# Patient Record
Sex: FEMALE | Race: BLACK OR AFRICAN AMERICAN | NOT HISPANIC OR LATINO
[De-identification: names, ages, dates, MRNs, and addresses within clinical notes are randomized per-mention and may not be internally consistent; named-entity substitution may affect disease eponyms.]

---

## 2020-09-27 ENCOUNTER — RECORDS - HEALTHEAST (OUTPATIENT)
Dept: ADMINISTRATIVE | Facility: OTHER | Age: 40
End: 2020-09-27

## 2021-06-05 VITALS — HEIGHT: 66 IN | BODY MASS INDEX: 30.99 KG/M2 | BODY MASS INDEX: 29.05 KG/M2 | WEIGHT: 180 LBS

## 2021-09-23 NOTE — TELEPHONE ENCOUNTER
RECORDS RECEIVED FROM:    DATE RECEIVED: 10.07.2021   NOTES STATUS DETAILS   OFFICE NOTE from referring provider N/A    OFFICE NOTE from other specialist  N/A    *Only VASCULITIS or LUPUS gather office notes for the following N/A    *PULMONARY   N/A    *ENT N/A    *DERMATOLOGY N/A    *RHEUMATOLOGY N/A    DISCHARGE SUMMARY from hospital Care Everywhere 09.14.2021 [facility]    DISCHARGE REPORT from the ER Care Everywhere 09.03.2021 [facility]   MEDICATION LIST Care Everywhere    IMAGING  (NEED IMAGES AND REPORTS)     KIDNEY CT SCAN N/A    KIDNEY ULTRASOUND N/A    MR ABDOMEN N/A    NUCLEAR MEDICINE RENAL N/A    LABS     CBC Care Everywhere 09.16.2021   CMP Care Everywhere 09.16.2021   BMP Care Everywhere 09.21.2021   UA Care Everywhere 09.12.2021   URINE PROTEIN Care Everywhere 09.12.2021   RENAL PANEL N/A    BIOPSY     KIDNEY BIOPSY  N/A

## 2021-10-04 DIAGNOSIS — E87.6 LOW SERUM POTASSIUM: ICD-10-CM

## 2021-10-04 DIAGNOSIS — R79.89 ELEVATED SERUM CREATININE: Primary | ICD-10-CM

## 2021-10-05 ENCOUNTER — TELEPHONE (OUTPATIENT)
Dept: NEPHROLOGY | Facility: CLINIC | Age: 41
End: 2021-10-05

## 2021-10-05 NOTE — TELEPHONE ENCOUNTER
Attempted to call the patient regarding appointment reminder and labs to be done before. Patient was sleeping and said she will call back.  Daksha Sanches LPN  Nephrology  [phone]

## 2021-10-07 ENCOUNTER — PRE VISIT (OUTPATIENT)
Dept: NEPHROLOGY | Facility: CLINIC | Age: 41
End: 2021-10-07

## 2021-10-07 ENCOUNTER — TELEPHONE (OUTPATIENT)
Dept: NEPHROLOGY | Facility: CLINIC | Age: 41
End: 2021-10-07

## 2021-10-07 NOTE — TELEPHONE ENCOUNTER
Call to patient.  Left voice message per Dr. Florian's request for a current medication record.  Updated Care everywhere, patient was seen yesterday at another outside Nephrology facility.  Message left for patient as this appointment today may not be needed any more. Will send to schedulers to call her and confirm  Daksha Sanches LPN  Nephrology  [phone]

## 2021-10-22 ENCOUNTER — TRANSCRIBE ORDERS (OUTPATIENT)
Dept: OTHER | Age: 41
End: 2021-10-22

## 2021-10-22 DIAGNOSIS — G37.9 DEMYELINATING DISEASE OF CENTRAL NERVOUS SYSTEM, UNSPECIFIED (H): Primary | ICD-10-CM

## 2021-11-17 DIAGNOSIS — R79.89 ELEVATED SERUM CREATININE: ICD-10-CM

## 2021-11-17 DIAGNOSIS — E87.1 HYPONATREMIA: Primary | ICD-10-CM

## 2021-11-17 NOTE — TELEPHONE ENCOUNTER
RECORDS RECEIVED FROM: Care Everywhere   DATE RECEIVED: 11.18.2021   NOTES STATUS DETAILS   OFFICE NOTE from referring provider N/A    OFFICE NOTE from other specialist  N/A    *Only VASCULITIS or LUPUS gather office notes for the following N/A    *PULMONARY   N/A    *ENT N/A    *DERMATOLOGY N/A    *RHEUMATOLOGY N/A    DISCHARGE SUMMARY from hospital Care Everywhee 09.14.2021 [facility]    DISCHARGE REPORT from the ER Care Everywhere 09.03.2021 [facility]     MEDICATION LIST Care Everywhere    IMAGING  (NEED IMAGES AND REPORTS)     KIDNEY CT SCAN Care Everywhere 09.03.2021 CT abdomen and pelvis      KIDNEY ULTRASOUND Care Everywhere - in process 10.28.2021 US Renal Bladder   MR ABDOMEN N/A    NUCLEAR MEDICINE RENAL N/A    LABS     CBC Care Everywhere 11.07.2021   CMP Care Everywhere 11.07.2021   BMP Care Everywhere 11.08.2021   UA Care Everywhere 11.07.12021   URINE PROTEIN Care Everywhere 11.07.2021   RENAL PANEL N/A    BIOPSY     KIDNEY BIOPSY  N/A       Action 11.17.2021 RM   Action Taken Called Akbar to get image pushed over called [phone] pending for images     Action 11.17.2021 RM 3:09PM   Action Taken Images received and uploaded to chart.

## 2021-11-18 ENCOUNTER — PRE VISIT (OUTPATIENT)
Dept: NEPHROLOGY | Facility: CLINIC | Age: 41
End: 2021-11-18
Payer: COMMERCIAL

## 2021-12-07 ENCOUNTER — HOSPITAL ENCOUNTER (OUTPATIENT)
Dept: OCCUPATIONAL THERAPY | Facility: CLINIC | Age: 41
Setting detail: THERAPIES SERIES
End: 2021-12-07
Attending: NURSE PRACTITIONER
Payer: COMMERCIAL

## 2021-12-07 DIAGNOSIS — G37.9 DEMYELINATING DISEASE OF CENTRAL NERVOUS SYSTEM, UNSPECIFIED (H): ICD-10-CM

## 2021-12-07 PROCEDURE — 97542 WHEELCHAIR MNGMENT TRAINING: CPT | Mod: GO | Performed by: OCCUPATIONAL THERAPIST

## 2021-12-07 NOTE — PROGRESS NOTES
"   SEATING AND WHEELED MOBILITY ASSESSMENT  12/07/21 0800   Quick Adds   Quick Adds Certification;Current Manual Wheelchair   General Information    Rehab Discipline OT   Funding [facility]   Service Outpatient;Occupational Therapy;Seating/Wheeled Mobility Evaluation   Height 5'6\"   Weight 180   Start Of Care Date 12/07/21   Referring Physician Chele Alexandra   Orders Evaluate And Treat As Indicated;Per Therapist Evaluation   Orders Date 10/22/21   Others Present at Evaluation Son   Patient/Caregiver Goals Power mobility   Rehabilitation Technology Supplier [employer] [facility] from [facility]   Current Community Support Personal Care Attendant;Family/Friend Caregiver  (9.5 hrs PCA)   Patient role/Employment history Disabled   Fall Risk Screen   Fall screen completed by OT   Have you fallen 2 or more times in the past year? No   Have you fallen and had an injury in the past year? No   Is patient a fall risk? Yes   Medical History   Onset Of Illness/injury Or Date Of Surgery 10/22/21  (Order date)   Medical Diagnosis Demyelinating disease   Comments Patient in process of getting final diagnosis CVA vs Ms types disease processes   Current Manual Wheelchair   Manual Wheelchair Comments rakeshaner manual chair   Home Accessibility   Living Environment House   Primary Entrance Stairs   Primary Entrance Width (inches) 2 stairs   All Rooms Wheelchair Accessible Yes   Home Accessibility Comments educated on ramp options that she will need to f/u with her  regarding   Community ADL   Transportation Car   Community Mobility Requirements Medical Appointments;Shopping   Cognitive/Visual/Hearing Status   Observations Impulsive;Attention Impaired   Vision Corrective Lenses;Impaired   ADL Status   Feeding Requires Assist   Grooming/Hygiene Requires Assist   Dressing Requires Assist   Toileting Requires Assist   Bathing Requires Assist;Uses Equipment  (shower chair)   Meal Preparation Unable   Home Management Unable   Balance "   Unsupported Sitting Balance Uses Upper Extremities for Balance   Sitting Balance in Chair Uses Upper Extremities for Balance   Standing Balance Unable   Ambulation   Ambulation Non Ambulatory   Transfers   Transfer Assist Moderate Assist   Transfer Method Stand Pivot   Sleep/Rest   Sleep Surface/Equipment Standard bed   Wheelchair Ability   Wheelchair Ability Quick Adds Manual Chair;Wheelchair Use   Manual Wheelchair Propulsion   Manual Wheelchair Propulsion Unable   Comments L Jatin   Wheelchair Use   Ability to Perform Weight Shifts Unable   Bed Confined Without Wheelchair? Yes   Hours Spent Alone Daily 0   Neuromuscular   History of Pressure Sores No   Current Pressure Sores No   Pain Yes   Pain Location L sided pain   Coordination UE Impaired;LE Impaired   Tone Hypotonic   Sensory Deficits Reported L sided jatin    Head and Neck   Head and Neck Position Flexed   Head Control Adequate   Tone/Movement of Head and Neck Limited rotation to the R   Upper Extremities   Shoulder Position Functional on Right   UE ROM R WFL L demos no active ranges except some finger flex/ext and able to hold and semi assist wtih AAROM   UE Strength R 4-5/ L 1/5   Dominance Right   Pelvis   Anterior/Posterior Pelvis Position Posterior Tilt   Trunk   Anterior/Posterior Trunk Position Increased Thoracic Kyphosis   Lower Extremities   LE ROM R WFL with weak end ranges and L able to hold knee extension   LE Strength R 4-/5 L 1/5   Foot Positioning Plantar flexed   LE Comments R LE dependent edema due to limited movement   Patient Measurements   Other per atp notes   Education Assessment   Barriers to Learning Physical;Emotional;Cognitive   Preferred Learning Style Listening;Demonstration   Assessment/Plan   Criteria for Skilled Interventions Met Yes, Treatment Indicated   Treatment Diagnosis impaired participation in MRADLS   Therapy Frequency once   Planned Therapy Interventions Wheelchair Management/Propulsion Training   Planned Therapy  Interventions Comments Patient 30  min late to apt and thus limited time to get PLOF.  Recent change in last 6 month and now dependent due to L hemiplegia of unknown etiology. Damaris cannot propel her loaner manual chair.   Risks and benefits of treatment have been explained Yes   Patient/family & other staff in agreement with plan of care Yes   Comments Patient to go to [facility] for trials today due to short session   Session Time   OT Wheelchair Management Minutes (75383) 35   Certification   Certification date from 12/07/21   Certification date to 12/07/21   Adult OT Eval Goals   OT Eval Goals (Adult) 1    OT Goal 1   Goal Identifier wheelchair   Goal Description Patient to demonstrate a successful clinical trial of the recommended wheelchair   Goal Progress in progress   Target Date 12/07/21   Date Met 12/07/21   Electronically signed by:  Marlee MCCARTY/LUDMILA, ATP      Occupational Therapist, Assistive   [phone]      fax: [phone]      [email or web address]  Seating Clinic- [facility], [address]  [address]  [place]   [address]

## 2022-04-07 NOTE — PROGRESS NOTES
REQUISITION AND JUSTIFICATION FOR DURABLE MEDICAL EQUIPMENT    Patient Name:  Colette Small  MR #:  [id number]  :  1980  Age/Gender:  42 year old female  Visit Date:  Colette Small seen for seating and wheeled mobility evaluation by Marele MCCARTY/L, ATP and ATP from [facility] on 21.    CLINICAL CRITERIA FOR MOBILITY ASSISTIVE EQUIPMENT  Coverage Criteria Per Local Coverage Determination  A) Colette has mobility limitations due to Demyelinating disease (CVA vs MS) that significantly impairs her ability to participate in all of her mobility-related activities of daily living (MRADL). Specifically affected are toileting (being able to get there in time to prevent accidents), dressing, and bathing (getting into the bathroom of designated place). Current equipment used is Pleyaner standard manual chair that she cannot independently propel. This patient needs the new equipment requested to be able to increase safe and independent participation in MRADLS. Please see additional documentation in the seating and wheeled mobility report for details.   Colette had a successful clinical trial here, and also a successful trial at home with the recommended equipment. Colette is very willing and physically / cognitively able to use the recommended equipment to assist her with mobility-related activities of daily living and general mobility. A group 3 power wheelchair is being requested because it has better suspension for a smooth ride and has the capabilities of expandable electronics to operate the  power seat functions Colette needs for independence with her activities of daily living. A Group 2 power wheelchair does not have sufficient electronics to support this patient's progressive neurological deficits due to progressive demyelinating diease.  B) Colette's mobility limitation cannot be sufficiently and safely resolved by the use of an appropriately fitted cane or walker because she is non ambulatory and  requires moderate assistance to safely transfer. TUG results Nt due to inability. Strength of legs is R 4-/5 L 1/5 for one maximal repetition. Fatigue also impacts this patient's ability to ambulate, regardless of the gait aid.    C) Colette does not have sufficient upper extremity function to self-propel an optimally-configured manual wheelchair in her home to perform MRADLs during a typical day due to limitations in strength, endurance, range of motion, and coordination. Distance and time to propel a light weight manual wheelchair Nt due to deficits.  Strength of arms is R 4-/5 L 1/5.  D)  Colette is not able to use a POV/scooter because it will not fit in her home environment. Colette is unable to safely transfer to and from a POV, unable to operate the tiller steering system, and unable to maintain postural stability and position while operating the POV. Colette needs more appropriate seating and positioning than any scooter seat provides.  E) The need for this equipment is LIFETIME.   RECOMMENDATIONS FOR MOBILITY BASE, SEATING SYSTEM AND COMPONENTS  Quantum Q6 Edge 3MP-SS - this mid wheel drive power wheelchair is needed for this patient to continue to have independent mobility and to be able to allow her to complete or assist in all of her mobility related activities of daily living (MRADLs). This wheelchair will also have the seating and positioning system and seat function she needs to be able to use and tolerate the wheelchair full time, and have functional and comfortable positioning for a full day's activities. Tracy has Demyelinating disease (CVA vs MS) which impairs her ability to move in her home without the use of the requested wheelchair. She lives in a home with stair access. The vendor can assist with delivery of chair into her home for MRADL use.  They will look into ramp options for the 4 stairs into home once the snow melts in order to get chair in/out.     100 amp Q-Logic 3 EX controller -  Needed  for operation of the joystick for mobility and seat functions    Harness for expandable controller - needs to be inline for communication between the controller and the joystick    Q-Logic 3 EX Joystick - this is the joystick needed to be used by this patient for moving this wheelchair and also controlling the movement of the seat functions.    Swing away joystick mount - needed to be able to move the joystick out of the way during transfers, or when at the desk using the computer, or at the table eating, so as not to inadvertently hit the joystick, thus moving the wheelchair or turning the power on or off without her knowledge.    KATHY Balance Power Tilt and Recline  - This seating function combination is needed for this patient to be able to perform independent weight shifts and also to be able to change her seated position without the request of a care giver. Colette requires a powered seating system with both tilt and recline to optimize pressure distribution and postural repositioning.   The power tilt seat allows her to change her position by rotating rearward without changing the angle of her hips or knees. Due to atrophy of her buttocks she is at high risk of developing pressure ulcers if not able to change her position. Due to compromised ability to exert herself for weight shifting, the power tilt seat allows her to do this by operating it through the joystick. She can also use a slight degree of tilt for assisting in her seating and positioning for normal functions during the day a to assist keeping her in a midline, erect and upright position by using the effect of gravity.   The power reclining back feature also reduces pressures by allowing her to change the angle of her hips and knees into a more open and supine / recumbent position. This increases her tolerance and be able to remain in the requested wheelchair for a complete day and not be dependent on care givers to change her position or transfer her  to another surface for comfort or resting. The recline feature can be used to access the perineal area necessary for toileting assistance. The power tilt seat and power recline back are necessary features that allow tasks to be done by the care giver without the need for transferring back to bed for these activities.  The medical justification for these seat functions is consistent with the RESNA Position Papers regarding these seat function interventions (Slava et al., 2009). These seat functions will also reduce upper extremity strain per the Paralyzed 's of Evelina Guidelines for upper limb preservation (Clarice, et al., 2005).    Power elevating seat - Vertical movement is necessary to allow Colette to function and participate in a three-dimensional world. This seat feature will increase her ability to reach by bringing her closer for better access with weak arms while reaching into cupboards or closets.  During the home trial she was able to use this feature to assist with safe transfers.  She is transferring moderate assist for stand pivot transfers. This requested seat lift feature promotes safety with and improved independence with lateral transfers by allowing a level transfer or transfer from a higher to lower surface, which is gravity-assisted.  It also facilitates forward transfer by allowing legs, hips to be more extended, thereby lessening the strength required for the user to perform a stand-pivot transfer.  Power seat elevation also allows the user to have eye contact with others and reduces cervical strain and pain (including headaches from poor positioning). Vertical rise also provides psycho-social benefits of being on peer level and speaking eye-to-eye. Additionally, seat elevation allows certain medications to be kept out of reach of children but remain accessible to the user.    KATHY comfort Solid curved and padded back support - firm and contoured back support is needed to support  "Colette's thoracolumbar area in an upright and midline position, with appropriate support pads as needed. This will provide support whether she is in the upright or tilted/reclined position. This back support is essential to provide sufficient lateral contour to maximize her postural alignment and minimize her tendency to develop scoliosis and other secondary complications.    Lateral wedges- needed to provide support for weak trunk muscles in order to provide upright posture for optimal environmental engagement.     Stealth height adjustable  needed to place the joystick of the wheelchair in a safe positioning for driving.    Stealth Comfort Plus 10\" headrest and mounting hardware - needed to keep Sarays head in an erect, midline and upright position whether she is in the upright, tilted or reclined position. Her head and neck need to be supported as well as the rest of her body.    Stealth worlds best mounting hardware - needed for mounting the requested headrest in the most appropriate position on the back of the wheelchair for optimal head and neck support and to be able to be removed for transfers.     Power Positioning electronics to be operated through the Q logic controller - this is needed to allow her to use the joystick of the wheelchair for control of mobility and operation of the power seat function. This is important for this patient with limited strength and coordination so as not to require a separate switch for operation of the seat function.    Power elevating foot legrest- Allows for elevation and extension of lower extremities while elevating. This can improve circulation and prevent/reduce edema (with recline/tilt combination).  The lower legs of this wheelchair user act as a reservoir for fluid accumulation due to lack of movement. Elevation of this patient's legs above the level of the heart (left atrium) is recommended as part of the management of edema in conjunction with other " measures such as support garments  This patient has lower extremity dependent edema while sitting in her wheelchair, which resolves with elevation of her legs. This feature, when used with the power recline back or tilt seat, can increase Colette's sitting tolerance while positioning her in a more natural position. This can also be helpful if she fatigues and requires rests throughout the day, without transferring her back to bed.  Due to inability to perform a functional weight shifting, she is at risk for developing pressure ulcers. With the use of this feature it will allow for optimal weight shifting in conjunction with tilt and recline.    Per RESNA white paper on elevating legrests, using elevating legrests and tilting more than 30 degrees in combination with full recline significantly improves lower leg hemodynamics status as measured by near-infrared spectroscopy (Ar et al., 2010)  Additionally, these legrests can improve ground clearance to navigate thresholds and slopes and still allowing the legs to achieve a tight 90 degree position for typical driving conditions. This position shortens the overall functional wheelbase for improved maneuverability    Width extension for footplate- allows footplates to safely support foot size    Footplate connector- needed to allow one movement to don/doff footplate     B Thigh Guide pads - to hold thighs straight and not splay out to the side    2 Adj/Removable brackets -to be able to remove the pads for transfers     Ovidio SP seat cushion - this pressure distribution and positioning seat cushion will optimally  distribute seating pressures to prevent pressure ulcers, but also provide a stable base of support for her to use during MRADLs.    2 Batteries and  - gel sealed, and two are necessary to power the wheelchair. They are maintenance free and are safe for travel on the road or in the air. They are necessary to provide reliable use of the power wheelchair on  a single charge.    This equipment is reasonable and necessary with reference to accepted standards of medical practice and treatment of this patient's condition and is not being recommended as a convenience item. Without this recommended equipment, she is highly likely to sustain injuries from falls, develop pressure sores or postural compensation, and/or be bed confined, which those costs far exceed the cost of the requested equipment.    Electronically signed by:  Marlee CODY, ATP      Occupational Therapist, Assistive   [phone]      fax: [phone]      [email or web address]  [place] [address]  April 7, 2022      I have read and concur with the above recommendations.    Physician Printed Name __________________________________________    Physician SIgnature  _____________________________________________    Date of SIgnature ______________________________    Physician Phone  ______________________________

## 2022-08-04 ENCOUNTER — HOSPITAL ENCOUNTER (EMERGENCY)
Facility: CLINIC | Age: 42
Discharge: HOME | End: 2022-08-04
Attending: EMERGENCY MEDICINE | Admitting: EMERGENCY MEDICINE
Payer: COMMERCIAL

## 2022-08-04 VITALS — TEMPERATURE: 98.2 F | HEART RATE: 76 BPM | OXYGEN SATURATION: 98 % | RESPIRATION RATE: 16 BRPM

## 2022-08-04 DIAGNOSIS — M54.12 CERVICAL RADICULOPATHY: ICD-10-CM

## 2022-08-04 PROCEDURE — 99282 EMERGENCY DEPT VISIT SF MDM: CPT | Performed by: EMERGENCY MEDICINE

## 2022-08-04 PROCEDURE — 99281 EMR DPT VST MAYX REQ PHY/QHP: CPT

## 2022-08-04 ASSESSMENT — ENCOUNTER SYMPTOMS
BACK PAIN: 0
FEVER: 0
LIGHT-HEADEDNESS: 0
CHILLS: 0
NECK PAIN: 1
NECK STIFFNESS: 0
HEADACHES: 0
SEIZURES: 0
DIZZINESS: 0
NUMBNESS: 1
FACIAL ASYMMETRY: 0
WEAKNESS: 1
FATIGUE: 0
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
## 2022-08-05 NOTE — ED TRIAGE NOTES
Triage Assessment     Row Name 08/04/22 2146       Triage Assessment (Adult)    Airway WDL WDL       Respiratory WDL    Respiratory WDL WDL       Cardiac WDL    Cardiac WDL WDL